# Patient Record
Sex: MALE | Race: WHITE | HISPANIC OR LATINO | Employment: FULL TIME | URBAN - METROPOLITAN AREA
[De-identification: names, ages, dates, MRNs, and addresses within clinical notes are randomized per-mention and may not be internally consistent; named-entity substitution may affect disease eponyms.]

---

## 2017-07-25 ENCOUNTER — OFFICE VISIT (OUTPATIENT)
Dept: URGENT CARE | Facility: PHYSICIAN GROUP | Age: 47
End: 2017-07-25

## 2017-07-25 VITALS
HEIGHT: 67 IN | BODY MASS INDEX: 23.92 KG/M2 | WEIGHT: 152.4 LBS | OXYGEN SATURATION: 100 % | HEART RATE: 80 BPM | DIASTOLIC BLOOD PRESSURE: 80 MMHG | SYSTOLIC BLOOD PRESSURE: 122 MMHG | RESPIRATION RATE: 19 BRPM | TEMPERATURE: 98.5 F

## 2017-07-25 DIAGNOSIS — R11.2 NAUSEA AND VOMITING, INTRACTABILITY OF VOMITING NOT SPECIFIED, UNSPECIFIED VOMITING TYPE: ICD-10-CM

## 2017-07-25 DIAGNOSIS — R10.9 LT FLANK PAIN: ICD-10-CM

## 2017-07-25 PROCEDURE — 99203 OFFICE O/P NEW LOW 30 MIN: CPT | Performed by: EMERGENCY MEDICINE

## 2017-07-25 RX ORDER — KETOROLAC TROMETHAMINE 30 MG/ML
30 INJECTION, SOLUTION INTRAMUSCULAR; INTRAVENOUS ONCE
Status: COMPLETED | OUTPATIENT
Start: 2017-07-25 | End: 2017-07-25

## 2017-07-25 RX ORDER — ONDANSETRON 4 MG/1
8 TABLET, ORALLY DISINTEGRATING ORAL ONCE
Status: COMPLETED | OUTPATIENT
Start: 2017-07-25 | End: 2017-07-25

## 2017-07-25 RX ADMIN — ONDANSETRON 8 MG: 4 TABLET, ORALLY DISINTEGRATING ORAL at 13:32

## 2017-07-25 RX ADMIN — KETOROLAC TROMETHAMINE 30 MG: 30 INJECTION, SOLUTION INTRAMUSCULAR; INTRAVENOUS at 13:32

## 2017-07-25 ASSESSMENT — ENCOUNTER SYMPTOMS
HEARTBURN: 0
VOMITING: 1
PAIN: 1
BLOOD IN STOOL: 0
CONSTIPATION: 0
NAUSEA: 1
LOSS OF CONSCIOUSNESS: 0
VERTIGO: 0
FLANK PAIN: 1
FEVER: 0
ANOREXIA: 1
CHANGE IN BOWEL HABIT: 0
SHORTNESS OF BREATH: 0
ABDOMINAL PAIN: 0
CHILLS: 1
DIZZINESS: 1
DIARRHEA: 0

## 2017-07-25 ASSESSMENT — PAIN SCALES - GENERAL: PAINLEVEL: 7=MODERATE-SEVERE PAIN

## 2017-07-25 NOTE — PROGRESS NOTES
Subjective:      Beck Dupont is a 47 y.o. male who presents with Pain            Pain  This is a new problem. The current episode started today. The problem occurs constantly. The problem has been unchanged. Associated symptoms include anorexia, chills, nausea, urinary symptoms and vomiting. Pertinent negatives include no abdominal pain, change in bowel habit, chest pain, fever, rash or vertigo. Associated symptoms comments: Left flank pain. Nothing aggravates the symptoms. He has tried nothing for the symptoms.    patient notes similar episode of left flank pain several weeks ago associated with some gross hematuria; pain lasted for a few days and then resolved. No PCP, no prior medical evaluation. No history of trauma, unknown family history.    Review of Systems   Constitutional: Positive for chills. Negative for fever.   Respiratory: Negative for shortness of breath.    Cardiovascular: Negative for chest pain.   Gastrointestinal: Positive for nausea, vomiting and anorexia. Negative for heartburn, abdominal pain, diarrhea, constipation, blood in stool and change in bowel habit.   Genitourinary: Positive for flank pain. Negative for dysuria, urgency, frequency and hematuria.        No penile discharge, no scrotal pain.   Skin: Negative for rash.   Neurological: Positive for dizziness. Negative for vertigo and loss of consciousness.     PMH:  has no past medical history on file.  MEDS: No current outpatient prescriptions on file.    Current facility-administered medications:   •  ondansetron (ZOFRAN ODT) dispertab 8 mg, 8 mg, Oral, Once, Lucio Soria M.D.  •  ketorolac (TORADOL) injection 30 mg, 30 mg, Intramuscular, Once, Lucio Soria M.D.  ALLERGIES: No Known Allergies  SURGHX: History reviewed. No pertinent past surgical history.  SOCHX:  reports that he has never smoked. His smokeless tobacco use includes Chew. He reports that he uses illicit drugs (Inhaled). He reports that he does not drink  "alcohol.  FH: family history is not on file.     Objective:     /80 mmHg  Pulse 80  Temp(Src) 36.9 °C (98.5 °F)  Resp 19  Ht 1.695 m (5' 6.73\")  Wt 69.128 kg (152 lb 6.4 oz)  BMI 24.06 kg/m2  SpO2 100%     Physical Exam   Constitutional: He is oriented to person, place, and time. He appears well-developed and well-nourished. He is cooperative.  Non-toxic appearance. He does not have a sickly appearance. He appears distressed.   HENT:   Mouth/Throat: Mucous membranes are normal.   Eyes: Conjunctivae are normal. No scleral icterus.   Neck: Phonation normal. Neck supple.   Cardiovascular: Normal rate, regular rhythm and normal heart sounds.    Pulmonary/Chest: Effort normal and breath sounds normal. He exhibits no tenderness.   Abdominal: Soft. Normal appearance. He exhibits no distension and no mass. Bowel sounds are decreased. There is no tenderness. There is CVA tenderness. Hernia confirmed negative in the right inguinal area and confirmed negative in the left inguinal area.   Genitourinary: Testes normal and penis normal. Circumcised. No penile erythema. No discharge found.   Musculoskeletal:        Lumbar back: He exhibits no tenderness and no bony tenderness.   Lymphadenopathy: No inguinal adenopathy noted on the right or left side.   Neurological: He is alert and oriented to person, place, and time. He exhibits normal muscle tone. He displays no seizure activity. Gait normal.   Initially diffusely tremulous, resolved.   Skin: Skin is warm, dry and intact. No rash noted.   Psychiatric: He has a normal mood and affect.     Unable to provide urine for testing.  Consistent with renal colic.  Concern for shaking chills for associated infection.     CT scanning unavailable for multiple hours at this facility. Advised patient to need for testing with imaging for diagnosis; advised emergency room evaluation and management. Patient and wife agreed; patient will be driven by wife. Patient appears to be stable " for transport by private car, was able to ambulate. [facility] emergency physician provided telephone report     Assessment/Plan:     1. Lt flank pain  - ketorolac (TORADOL) injection 30 mg; 1 mL by Intramuscular route Once.    2. Nausea and vomiting, intractability of vomiting not specified, unspecified vomiting type  - ondansetron (ZOFRAN ODT) dispertab 8 mg; Take 2 Tabs by mouth Once.

## 2022-02-06 ENCOUNTER — OFFICE VISIT (OUTPATIENT)
Dept: URGENT CARE | Facility: CLINIC | Age: 52
End: 2022-02-06

## 2022-02-06 VITALS
RESPIRATION RATE: 12 BRPM | BODY MASS INDEX: 23.82 KG/M2 | HEART RATE: 91 BPM | WEIGHT: 148.2 LBS | DIASTOLIC BLOOD PRESSURE: 82 MMHG | SYSTOLIC BLOOD PRESSURE: 120 MMHG | OXYGEN SATURATION: 97 % | TEMPERATURE: 98.1 F | HEIGHT: 66 IN

## 2022-02-06 DIAGNOSIS — U07.1 COVID-19: ICD-10-CM

## 2022-02-06 DIAGNOSIS — E86.0 DEHYDRATION, MILD: ICD-10-CM

## 2022-02-06 DIAGNOSIS — M54.50 ACUTE BILATERAL LOW BACK PAIN WITHOUT SCIATICA: ICD-10-CM

## 2022-02-06 LAB
APPEARANCE UR: NORMAL
BILIRUB UR STRIP-MCNC: NORMAL MG/DL
COLOR UR AUTO: NORMAL
GLUCOSE UR STRIP.AUTO-MCNC: NEGATIVE MG/DL
KETONES UR STRIP.AUTO-MCNC: NORMAL MG/DL
LEUKOCYTE ESTERASE UR QL STRIP.AUTO: NEGATIVE
NITRITE UR QL STRIP.AUTO: NEGATIVE
PH UR STRIP.AUTO: 6 [PH] (ref 5–8)
PROT UR QL STRIP: NORMAL MG/DL
RBC UR QL AUTO: NORMAL
SP GR UR STRIP.AUTO: 10.25
UROBILINOGEN UR STRIP-MCNC: NORMAL MG/DL

## 2022-02-06 PROCEDURE — 99203 OFFICE O/P NEW LOW 30 MIN: CPT | Mod: CS | Performed by: PHYSICIAN ASSISTANT

## 2022-02-06 PROCEDURE — 81002 URINALYSIS NONAUTO W/O SCOPE: CPT | Performed by: PHYSICIAN ASSISTANT

## 2022-02-06 NOTE — PROGRESS NOTES
Subjective:   Kobi Dupont is a 52 y.o. male who presents for Fever (consitpation, kidney stones, lower back, tested positive for COVID 1/31/2022, fever comes and goes, chills, loss of apatite,  x 10 days)      HPI  52 y.o. male presents to urgent care with new problem to provider of COVID-19 viral infection diagnosed about 1 week ago.  Patient presents today with complaints of intermittent fevers, body aches, low back pain, chills, decreased appetite, and constipation.  He denies blood in urine, urinary frequency/urgency, dysuria, lower abdominal pain, or vomiting.  Patient reports associated URI symptoms of cough and congestion.  He is not vaccinated for COVID-19. Denies other associated aggravating or alleviating factors.     Review of Systems   Constitutional: Positive for chills, fever and malaise/fatigue.   HENT: Positive for congestion.    Respiratory: Positive for cough and sputum production. Negative for shortness of breath and wheezing.    Cardiovascular: Negative for chest pain.   Gastrointestinal: Positive for constipation. Negative for abdominal pain, diarrhea, nausea and vomiting.   Genitourinary: Negative for dysuria, flank pain, frequency, hematuria and urgency.   Musculoskeletal: Positive for back pain and myalgias.   Neurological: Positive for headaches.       There is no problem list on file for this patient.    History reviewed. No pertinent surgical history.  Social History     Tobacco Use   • Smoking status: Never Smoker   • Smokeless tobacco: Current User     Types: Chew   Vaping Use   • Vaping Use: Never used   Substance Use Topics   • Alcohol use: Yes     Comment: occ   • Drug use: Yes     Types: Marijuana, Inhaled      History reviewed. No pertinent family history.   (Allergies, Medications, & Tobacco/Substance Use were reconciled by the Medical Assistant and reviewed by myself. The family history is prepopulated)     Objective:     /82 (BP Location: Left arm, Patient Position:  "Sitting, BP Cuff Size: Adult)   Pulse 91   Temp 36.7 °C (98.1 °F)   Resp 12   Ht 1.68 m (5' 6.14\")   Wt 67.2 kg (148 lb 3.2 oz)   SpO2 97%   BMI 23.82 kg/m²     Physical Exam  Vitals reviewed.   Constitutional:       General: He is not in acute distress.     Appearance: Normal appearance. He is not ill-appearing or diaphoretic.   HENT:      Head: Normocephalic and atraumatic.      Nose: Nose normal.      Mouth/Throat:      Mouth: Mucous membranes are dry.      Pharynx: Posterior oropharyngeal erythema present. No oropharyngeal exudate.   Eyes:      Conjunctiva/sclera: Conjunctivae normal.   Cardiovascular:      Rate and Rhythm: Normal rate and regular rhythm.      Heart sounds: Normal heart sounds. No murmur heard.  No friction rub. No gallop.    Pulmonary:      Effort: Pulmonary effort is normal. No respiratory distress.      Breath sounds: Normal breath sounds. No wheezing, rhonchi or rales.   Abdominal:      Palpations: Abdomen is soft.      Tenderness: There is no abdominal tenderness. There is no right CVA tenderness, left CVA tenderness or guarding.   Musculoskeletal:         General: Normal range of motion.      Cervical back: Normal range of motion and neck supple.   Lymphadenopathy:      Cervical: No cervical adenopathy.   Skin:     General: Skin is warm and dry.   Neurological:      General: No focal deficit present.      Mental Status: He is alert and oriented to person, place, and time.   Psychiatric:         Mood and Affect: Mood normal.         Behavior: Behavior normal.         Thought Content: Thought content normal.         Judgment: Judgment normal.         Assessment/Plan:     1. Acute bilateral low back pain without sciatica  POCT Urinalysis   2. COVID-19     3. Dehydration, mild       Results for orders placed or performed in visit on 02/06/22   POCT Urinalysis   Result Value Ref Range    POC Color Aida Negative    POC Appearance Slightly Cloudy Negative    POC Leukocyte Esterase " Negative Negative    POC Nitrites Negative Negative    POC Urobiligen 1.0 E.U./dL Negative (0.2) mg/dL    POC Protein 100mg/dL Negative mg/dL    POC Urine PH 6.0 5.0 - 8.0    POC Blood small Negative    POC Specific Gravity 10.25 <1.005 - >1.030    POC Ketones 40mg/dL Negative mg/dL    POC Bilirubin Small Negative mg/dL    POC Glucose Negative Negative mg/dL     No concern for kidney stones on urgent care evaluation today.  No CVA tenderness or blood in urinalysis.  Patient has 1 week history of COVID-19 viral infection with symptoms of intermittent fevers and body aches.  He has not been drinking much fluids and I suspected dehydration causing increased body aches and constipation.  I recommend OTC medications for symptomatic relief.  Drink plenty of fluids including electrolyte replacement such as Pedialyte.  Continue to monitor for fevers or worsening symptoms of cough, persistent fevers, chest pain, or shortness of breath.    Differential diagnosis, natural history, supportive care, and indications for immediate follow-up discussed.    Advised the patient to follow-up with the primary care physician for recheck, reevaluation, and consideration of further management.  Patient verbalized understanding of treatment plan and has no further questions regarding care.     I personally reviewed prior external notes and test results pertinent to today's visit.   Please note that this dictation was created using voice recognition software. I have made a reasonable attempt to correct obvious errors, but I expect that there are errors of grammar and possibly content that I did not discover before finalizing the note.    This note was electronically signed by Ivette Freeman PA-C

## 2022-02-07 ASSESSMENT — ENCOUNTER SYMPTOMS
WHEEZING: 0
SHORTNESS OF BREATH: 0
COUGH: 1
FLANK PAIN: 0
HEADACHES: 1
CHILLS: 1
MYALGIAS: 1
FEVER: 1
DIARRHEA: 0
CONSTIPATION: 1
BACK PAIN: 1
NAUSEA: 0
ABDOMINAL PAIN: 0
SPUTUM PRODUCTION: 1
VOMITING: 0